# Patient Record
Sex: FEMALE | Race: BLACK OR AFRICAN AMERICAN | NOT HISPANIC OR LATINO | Employment: OTHER | URBAN - METROPOLITAN AREA
[De-identification: names, ages, dates, MRNs, and addresses within clinical notes are randomized per-mention and may not be internally consistent; named-entity substitution may affect disease eponyms.]

---

## 2018-06-21 ENCOUNTER — HISTORICAL (OUTPATIENT)
Dept: ADMINISTRATIVE | Facility: HOSPITAL | Age: 33
End: 2018-06-21

## 2018-06-21 LAB
ALT SERPL-CCNC: 34 UNIT/L (ref 12–78)
AST SERPL-CCNC: 24 UNIT/L (ref 15–37)
CREAT SERPL-MCNC: 0.74 MG/DL (ref 0.55–1.02)
CREAT UR-MCNC: 191 MG/DL
ERYTHROCYTE [DISTWIDTH] IN BLOOD BY AUTOMATED COUNT: 13.3 % (ref 11.5–17)
GLUCOSE 1H P 100 G GLC PO SERPL-MCNC: 74 MG/DL (ref 100–180)
HCT VFR BLD AUTO: 35 % (ref 37–47)
HGB BLD-MCNC: 11.1 GM/DL (ref 12–16)
LDH SERPL-CCNC: 230 UNIT/L (ref 84–246)
MCH RBC QN AUTO: 29.5 PG (ref 27–31)
MCHC RBC AUTO-ENTMCNC: 31.7 GM/DL (ref 33–36)
MCV RBC AUTO: 93.1 FL (ref 80–94)
PLATELET # BLD AUTO: 297 X10(3)/MCL (ref 130–400)
PMV BLD AUTO: 10.6 FL (ref 9.4–12.4)
PROT 24H UR-MCNC: 45.9 MG/24HR (ref 0–165)
RBC # BLD AUTO: 3.76 X10(6)/MCL (ref 4.2–5.4)
URATE SERPL-MCNC: 2.8 MG/DL (ref 2.6–7.2)
WBC # SPEC AUTO: 12.5 X10(3)/MCL (ref 4.5–11.5)

## 2018-09-13 ENCOUNTER — HISTORICAL (OUTPATIENT)
Dept: ADMINISTRATIVE | Facility: HOSPITAL | Age: 33
End: 2018-09-13

## 2018-09-13 LAB — GLUCOSE 1H P 100 G GLC PO SERPL-MCNC: 134 MG/DL (ref 100–180)

## 2018-09-28 ENCOUNTER — HISTORICAL (OUTPATIENT)
Dept: ADMINISTRATIVE | Facility: HOSPITAL | Age: 33
End: 2018-09-28

## 2018-09-28 LAB — GLUCOSE BS SERPL-MCNC: 84 MG/DL (ref 70–115)

## 2018-10-31 ENCOUNTER — HISTORICAL (OUTPATIENT)
Dept: ADMINISTRATIVE | Facility: HOSPITAL | Age: 33
End: 2018-10-31

## 2018-10-31 LAB
GLUCOSE 1H P 100 G GLC PO SERPL-MCNC: 132 MG/DL (ref 100–180)
GLUCOSE 2H P 100 G GLC PO SERPL-MCNC: 94 MG/DL (ref 70–140)
GLUCOSE 3H P 100 G GLC PO SERPL-MCNC: 68 MG/DL (ref 70–115)
GLUCOSE BS SERPL-MCNC: 104 MG/DL (ref 70–115)

## 2022-07-23 ENCOUNTER — HOSPITAL ENCOUNTER (EMERGENCY)
Facility: HOSPITAL | Age: 37
Discharge: HOME | End: 2022-07-23
Attending: STUDENT IN AN ORGANIZED HEALTH CARE EDUCATION/TRAINING PROGRAM
Payer: MEDICAID

## 2022-07-23 VITALS
HEIGHT: 67 IN | OXYGEN SATURATION: 100 % | BODY MASS INDEX: 35.31 KG/M2 | TEMPERATURE: 98 F | HEART RATE: 105 BPM | DIASTOLIC BLOOD PRESSURE: 80 MMHG | RESPIRATION RATE: 20 BRPM | WEIGHT: 225 LBS | SYSTOLIC BLOOD PRESSURE: 119 MMHG

## 2022-07-23 DIAGNOSIS — S61.412A LACERATION OF LEFT HAND WITHOUT FOREIGN BODY, INITIAL ENCOUNTER: Primary | ICD-10-CM

## 2022-07-23 PROCEDURE — 99284 EMERGENCY DEPT VISIT MOD MDM: CPT | Mod: 25

## 2022-07-23 PROCEDURE — 12001 RPR S/N/AX/GEN/TRNK 2.5CM/<: CPT | Mod: LT

## 2022-07-23 RX ORDER — IBUPROFEN 800 MG/1
800 TABLET ORAL EVERY 6 HOURS PRN
Qty: 20 TABLET | Refills: 0 | Status: SHIPPED | OUTPATIENT
Start: 2022-07-23 | End: 2022-07-28

## 2022-07-23 RX ORDER — SULFAMETHOXAZOLE AND TRIMETHOPRIM 800; 160 MG/1; MG/1
1 TABLET ORAL 2 TIMES DAILY
Qty: 10 TABLET | Refills: 0 | Status: SHIPPED | OUTPATIENT
Start: 2022-07-23 | End: 2022-07-28

## 2022-07-24 NOTE — ED PROVIDER NOTES
"Encounter Date: 7/23/2022       History     Chief Complaint   Patient presents with    Hand Injury     Complaint of cutting left hand with "craft hot knife".  No active bleeding.      36 y.o. female presents to the ED with laceration to left hand onset PTA. States she was engraving a vase when the sautering knife cut her hand. Denies any other injuries. Tetanus UTD. Bleeding controlled    The history is provided by the patient. No  was used.   Hand Injury   The incident occurred just prior to arrival. The incident occurred at home. The injury mechanism was an incision. The pain is present in the left hand. The quality of the pain is described as throbbing. The pain has been constant since the incident. Pertinent negatives include no fever. She reports no foreign bodies present. The symptoms are aggravated by movement.     Review of patient's allergies indicates:   Allergen Reactions    Promethazine Hives    Hydromorphone Anxiety     No past medical history on file.  No past surgical history on file.  No family history on file.     Review of Systems   Constitutional: Negative for chills and fever.   Eyes: Negative for visual disturbance.   Respiratory: Negative for cough and shortness of breath.    Cardiovascular: Negative for chest pain.   Gastrointestinal: Negative for abdominal pain, nausea and vomiting.   Genitourinary: Negative for dysuria.   Musculoskeletal: Negative for arthralgias.   Skin: Positive for wound. Negative for color change and rash.   Neurological: Negative for dizziness and headaches.   Psychiatric/Behavioral: Negative for behavioral problems.   All other systems reviewed and are negative.      Physical Exam     Initial Vitals [07/23/22 2006]   BP Pulse Resp Temp SpO2   119/80 105 20 98.2 °F (36.8 °C) 100 %      MAP       --         Physical Exam    Nursing note and vitals reviewed.  Constitutional: She appears well-developed and well-nourished.   HENT:   Head: " Normocephalic and atraumatic.   Eyes: EOM are normal. Pupils are equal, round, and reactive to light.   Neck: Neck supple.   Cardiovascular: Normal rate, regular rhythm and normal heart sounds.   Pulmonary/Chest: Breath sounds normal.   Abdominal: Abdomen is soft. Bowel sounds are normal.   Musculoskeletal:         General: Normal range of motion.      Cervical back: Neck supple.     Neurological: She is alert and oriented to person, place, and time. She has normal strength. GCS score is 15. GCS eye subscore is 4. GCS verbal subscore is 5. GCS motor subscore is 6.   Skin: Skin is warm and dry.        Psychiatric: She has a normal mood and affect.         ED Course   Lac Repair    Date/Time: 7/23/2022 9:02 PM  Performed by: Seb Tamayo PA-C  Authorized by: Agustin Cotton MD     Consent:     Consent obtained:  Verbal    Consent given by:  Patient    Risks, benefits, and alternatives were discussed: yes      Risks discussed:  Infection and pain  Universal protocol:     Patient identity confirmed:  Verbally with patient  Anesthesia:     Anesthesia method:  Local infiltration    Local anesthetic:  Lidocaine 2% w/o epi  Laceration details:     Location:  Hand    Hand location:  L hand, dorsum    Length (cm):  1  Exploration:     Hemostasis achieved with:  Direct pressure    Contaminated: no    Treatment:     Area cleansed with:  Povidone-iodine    Amount of cleaning:  Standard    Irrigation solution:  Sterile saline    Debridement:  None  Skin repair:     Repair method:  Sutures    Suture size:  5-0    Suture material:  Prolene    Suture technique:  Simple interrupted    Number of sutures:  3  Approximation:     Approximation:  Close  Repair type:     Repair type:  Simple  Post-procedure details:     Dressing:  Non-adherent dressing    Procedure completion:  Tolerated well, no immediate complications      Labs Reviewed - No data to display       Imaging Results    None          Medications - No data to  display  Medical Decision Making:   Differential Diagnosis:   Laceration, wound, contusion                       Clinical Impression:   Final diagnoses:  [S68.412C] Laceration of left hand without foreign body, initial encounter (Primary)          ED Disposition Condition    Discharge Stable        ED Prescriptions     Medication Sig Dispense Start Date End Date Auth. Provider    sulfamethoxazole-trimethoprim 800-160mg (BACTRIM DS) 800-160 mg Tab Take 1 tablet by mouth 2 (two) times daily. for 5 days 10 tablet 7/23/2022 7/28/2022 Seb Tamayo PA-C    ibuprofen (ADVIL,MOTRIN) 800 MG tablet Take 1 tablet (800 mg total) by mouth every 6 (six) hours as needed for Pain. 20 tablet 7/23/2022 7/28/2022 Seb Tamayo PA-C        Follow-up Information     Follow up With Specialties Details Why Contact Info    Primary care provider  In 2 days As needed, For suture removal in 7-10 days     [facility] - Emergency Dept Emergency Medicine In 1 week If symptoms worsen, For suture removal in 7-10 days [address] [facility] [id number] [phone]           Seb Tamayo PA-C  07/23/22 0424

## 2022-11-22 ENCOUNTER — HOSPITAL ENCOUNTER (EMERGENCY)
Facility: HOSPITAL | Age: 37
Discharge: HOME | End: 2022-11-22
Attending: STUDENT IN AN ORGANIZED HEALTH CARE EDUCATION/TRAINING PROGRAM
Payer: MEDICAID

## 2022-11-22 VITALS
DIASTOLIC BLOOD PRESSURE: 74 MMHG | RESPIRATION RATE: 16 BRPM | TEMPERATURE: 98 F | BODY MASS INDEX: 33.75 KG/M2 | SYSTOLIC BLOOD PRESSURE: 135 MMHG | HEART RATE: 102 BPM | WEIGHT: 215 LBS | OXYGEN SATURATION: 100 %

## 2022-11-22 DIAGNOSIS — M54.2 NECK PAIN: Primary | ICD-10-CM

## 2022-11-22 DIAGNOSIS — R51.9 NONINTRACTABLE HEADACHE, UNSPECIFIED CHRONICITY PATTERN, UNSPECIFIED HEADACHE TYPE: ICD-10-CM

## 2022-11-22 DIAGNOSIS — J11.1 FLU: ICD-10-CM

## 2022-11-22 LAB
ABS NEUT (OLG): 4.54 X10(3)/MCL (ref 2.1–9.2)
ALBUMIN SERPL-MCNC: 3.2 GM/DL (ref 3.5–5)
ALBUMIN/GLOB SERPL: 0.9 RATIO (ref 1.1–2)
ALP SERPL-CCNC: 86 UNIT/L (ref 40–150)
ALT SERPL-CCNC: 11 UNIT/L (ref 0–55)
APPEARANCE UR: ABNORMAL
AST SERPL-CCNC: 15 UNIT/L (ref 5–34)
B-HCG SERPL QL: NEGATIVE
BACTERIA #/AREA URNS AUTO: ABNORMAL /HPF
BILIRUB UR QL STRIP.AUTO: NEGATIVE MG/DL
BILIRUBIN DIRECT+TOT PNL SERPL-MCNC: 0.1 MG/DL
BUN SERPL-MCNC: 10.6 MG/DL (ref 7–18.7)
CALCIUM SERPL-MCNC: 8.9 MG/DL (ref 8.4–10.2)
CHLORIDE SERPL-SCNC: 107 MMOL/L (ref 98–107)
CO2 SERPL-SCNC: 27 MMOL/L (ref 22–29)
COLOR UR AUTO: YELLOW
CREAT SERPL-MCNC: 0.8 MG/DL (ref 0.55–1.02)
EOSINOPHIL NFR BLD MANUAL: 0.15 X10(3)/MCL (ref 0–0.9)
EOSINOPHIL NFR BLD MANUAL: 2 %
ERYTHROCYTE [DISTWIDTH] IN BLOOD BY AUTOMATED COUNT: 13.2 % (ref 11.5–17)
FLUAV AG UPPER RESP QL IA.RAPID: DETECTED
FLUBV AG UPPER RESP QL IA.RAPID: NOT DETECTED
GFR SERPLBLD CREATININE-BSD FMLA CKD-EPI: >60 MLS/MIN/1.73/M2
GIANT PLATELETS: NORMAL
GLOBULIN SER-MCNC: 3.6 GM/DL (ref 2.4–3.5)
GLUCOSE SERPL-MCNC: 114 MG/DL (ref 74–100)
GLUCOSE UR QL STRIP.AUTO: NEGATIVE MG/DL
HCT VFR BLD AUTO: 42.5 % (ref 37–47)
HGB BLD-MCNC: 13.1 GM/DL (ref 12–16)
IMM GRANULOCYTES # BLD AUTO: 0.02 X10(3)/MCL (ref 0–0.04)
IMM GRANULOCYTES NFR BLD AUTO: 0.3 %
INSTRUMENT WBC (OLG): 7.7 X10(3)/MCL
KETONES UR QL STRIP.AUTO: NEGATIVE MG/DL
LEUKOCYTE ESTERASE UR QL STRIP.AUTO: NEGATIVE UNIT/L
LYMPHOCYTES NFR BLD MANUAL: 2.46 X10(3)/MCL
LYMPHOCYTES NFR BLD MANUAL: 32 %
MCH RBC QN AUTO: 28.2 PG (ref 27–31)
MCHC RBC AUTO-ENTMCNC: 30.8 MG/DL (ref 33–36)
MCV RBC AUTO: 91.4 FL (ref 80–94)
MONOCYTES NFR BLD MANUAL: 0.62 X10(3)/MCL (ref 0.1–1.3)
MONOCYTES NFR BLD MANUAL: 8 %
NEUTROPHILS NFR BLD MANUAL: 59 %
NITRITE UR QL STRIP.AUTO: NEGATIVE
NRBC BLD AUTO-RTO: 0 %
PH UR STRIP.AUTO: 7 [PH]
PLATELET # BLD AUTO: 339 X10(3)/MCL (ref 130–400)
PLATELET # BLD EST: NORMAL 10*3/UL
PMV BLD AUTO: 10.2 FL (ref 7.4–10.4)
POTASSIUM SERPL-SCNC: 4.4 MMOL/L (ref 3.5–5.1)
PROT SERPL-MCNC: 6.8 GM/DL (ref 6.4–8.3)
PROT UR QL STRIP.AUTO: NEGATIVE MG/DL
RBC # BLD AUTO: 4.65 X10(6)/MCL (ref 4.2–5.4)
RBC #/AREA URNS AUTO: <5 /HPF
RBC MORPH BLD: NORMAL
RBC UR QL AUTO: NEGATIVE UNIT/L
RET# (OHS): 0.04 (ref 0.02–0.08)
RETICULOCYTE COUNT AUTOMATED (OLG): 0.87 % (ref 1.1–2.1)
SARS-COV-2 RNA RESP QL NAA+PROBE: NOT DETECTED
SODIUM SERPL-SCNC: 141 MMOL/L (ref 136–145)
SP GR UR STRIP.AUTO: 1.02 (ref 1–1.03)
SQUAMOUS #/AREA URNS AUTO: 14 /HPF
UROBILINOGEN UR STRIP-ACNC: 0.2 MG/DL
WBC # SPEC AUTO: 7.7 X10(3)/MCL (ref 4.5–11.5)
WBC #/AREA URNS AUTO: 6 /HPF

## 2022-11-22 PROCEDURE — 81025 URINE PREGNANCY TEST: CPT | Performed by: PHYSICIAN ASSISTANT

## 2022-11-22 PROCEDURE — 81001 URINALYSIS AUTO W/SCOPE: CPT | Performed by: PHYSICIAN ASSISTANT

## 2022-11-22 PROCEDURE — 85045 AUTOMATED RETICULOCYTE COUNT: CPT | Performed by: PHYSICIAN ASSISTANT

## 2022-11-22 PROCEDURE — 85027 COMPLETE CBC AUTOMATED: CPT | Performed by: PHYSICIAN ASSISTANT

## 2022-11-22 PROCEDURE — 99283 EMERGENCY DEPT VISIT LOW MDM: CPT | Mod: 25

## 2022-11-22 PROCEDURE — 80053 COMPREHEN METABOLIC PANEL: CPT | Performed by: PHYSICIAN ASSISTANT

## 2022-11-22 PROCEDURE — 0240U COVID/FLU A&B PCR: CPT | Performed by: PHYSICIAN ASSISTANT

## 2022-11-22 PROCEDURE — 85025 COMPLETE CBC W/AUTO DIFF WBC: CPT | Performed by: PHYSICIAN ASSISTANT

## 2022-11-22 RX ORDER — OSELTAMIVIR PHOSPHATE 75 MG/1
75 CAPSULE ORAL 2 TIMES DAILY
Qty: 10 CAPSULE | Refills: 0 | Status: SHIPPED | OUTPATIENT
Start: 2022-11-22 | End: 2022-11-27

## 2022-11-22 RX ORDER — KETOROLAC TROMETHAMINE 30 MG/ML
60 INJECTION, SOLUTION INTRAMUSCULAR; INTRAVENOUS
Status: DISCONTINUED | OUTPATIENT
Start: 2022-11-22 | End: 2022-11-22 | Stop reason: HOSPADM

## 2022-11-22 NOTE — FIRST PROVIDER EVALUATION
Medical screening examination initiated.  I have conducted a focused provider triage encounter, findings are as follows:    Brief history of present illness:  38 yo female with hx of sickle cell for evaluation of neck pain radiating to her head. States subjective fever at home.     Vitals:    11/22/22 1638   BP: 110/76   BP Location: Left arm   Patient Position: Sitting   Pulse: 105   Resp: 16   Temp: 98.4 °F (36.9 °C)   TempSrc: Oral   SpO2: 98%   Weight: 97.5 kg (215 lb)       Pertinent physical exam:  Patient is awake and alert and oriented.  Ambulatory to triage.  In no acute distress.      Brief workup plan:  Labs, COVID/flu, UA, UPT, toradol IM    Preliminary workup initiated; this workup will be continued and followed by the physician or advanced practice provider that is assigned to the patient when roomed.

## 2022-11-23 NOTE — ED PROVIDER NOTES
Encounter Date: 11/22/2022       History     Chief Complaint   Patient presents with    Headache    Neck Pain     Pt presents c/o subjective fever, headache and neck pain on right side. Onset x 3 days.  Denies n/v.  PMH sickle cell.      37 y.o.  female with a history of sickle cell anemia presents to Emergency Department with a chief complaint of headache. Symptoms began 3 days ago and have been constant since onset. Her head pain is currently rated as a 4/10 in severity with no radiation. Associated symptoms include atraumatic neck pain and subjective fever. Symptoms are aggravated with movement and there are no alleviating factors. The patient denies CP, SOB, chills, weakness, abdominal pain, or dizziness. She reports taking Theraflu and NyQuil prior to arrival with mild relief of symptoms. No other reported symptoms at this time.       The history is provided by the patient. No  was used.   Headache   This is a new problem. The current episode started in the past 7 days. The problem occurs daily. The problem has been unchanged. The pain does not radiate. The pain is at a severity of 4/10. Associated symptoms include a fever and neck pain. Pertinent negatives include no abdominal pain, back pain, dizziness, nausea, numbness, photophobia, rhinorrhea, scalp tenderness, sore throat, tingling, vomiting or weakness.   Neck Pain   This is a new problem. The current episode started several days ago. The problem occurs throughout the day. The problem has been unchanged. The pain is associated with nothing. The pain is present in the generalized neck. The quality of the pain is described as stabbing. The pain is at a severity of 4/10. Associated symptoms include headaches. Pertinent negatives include no photophobia, no chest pain, no numbness, no bowel incontinence, no bladder incontinence, no leg pain, no paresis, no tingling and no weakness.   Review of patient's allergies indicates:    Allergen Reactions    Promethazine Hives    Hydromorphone Anxiety     Past Medical History:   Diagnosis Date    Sickle-cell disease without crisis      History reviewed. No pertinent surgical history.  No family history on file.  Social History     Tobacco Use    Smoking status: Never    Smokeless tobacco: Never   Substance Use Topics    Alcohol use: Never     Review of Systems   Constitutional:  Positive for fever. Negative for chills and fatigue.   HENT:  Negative for rhinorrhea and sore throat.    Eyes:  Negative for photophobia and visual disturbance.   Respiratory:  Negative for chest tightness, shortness of breath and stridor.    Cardiovascular:  Negative for chest pain and leg swelling.   Gastrointestinal:  Negative for abdominal pain, bowel incontinence, nausea and vomiting.   Genitourinary:  Negative for bladder incontinence.   Musculoskeletal:  Positive for neck pain. Negative for back pain.   Skin:  Negative for color change and wound.   Neurological:  Positive for headaches. Negative for dizziness, tingling, weakness and numbness.   All other systems reviewed and are negative.    Physical Exam     Initial Vitals [11/22/22 1638]   BP Pulse Resp Temp SpO2   110/76 105 16 98.4 °F (36.9 °C) 98 %      MAP       --         Physical Exam    Nursing note and vitals reviewed.  Constitutional: She appears well-developed and well-nourished. She is not diaphoretic. No distress.   HENT:   Head: Normocephalic and atraumatic.   Right Ear: External ear normal.   Left Ear: External ear normal.   Mouth/Throat: Oropharynx is clear and moist. No oropharyngeal exudate.   Eyes: Conjunctivae and EOM are normal. Pupils are equal, round, and reactive to light. Right eye exhibits no discharge. Left eye exhibits no discharge.   Neck: Neck supple. No JVD present.   Normal range of motion.  Cardiovascular:  Normal rate, regular rhythm, normal heart sounds and intact distal pulses.           Pulmonary/Chest: Breath sounds normal.  No stridor. No respiratory distress. She has no wheezes. She exhibits no tenderness.   Abdominal: Abdomen is soft. Bowel sounds are normal. She exhibits no distension. There is no abdominal tenderness. There is no guarding.   Musculoskeletal:         General: Tenderness (to neck) present. No edema. Normal range of motion.      Cervical back: Normal range of motion and neck supple. No edema or erythema. Muscular tenderness present.     Neurological: She is alert and oriented to person, place, and time. She has normal strength. No sensory deficit. GCS score is 15. GCS eye subscore is 4. GCS verbal subscore is 5. GCS motor subscore is 6.   Skin: Skin is warm and dry. Capillary refill takes less than 2 seconds. No rash noted. No erythema.   Psychiatric: She has a normal mood and affect. Thought content normal.       ED Course   Procedures  Labs Reviewed   COVID/FLU A&B PCR - Abnormal; Notable for the following components:       Result Value    Influenza A PCR Detected (*)     All other components within normal limits    Narrative:     The Xpert Xpress SARS-CoV-2/FLU/RSV plus is a rapid, multiplexed real-time PCR test intended for the simultaneous qualitative detection and differentiation of SARS-CoV-2, Influenza A, Influenza B, and respiratory syncytial virus (RSV) viral RNA in either nasopharyngeal swab or nasal swab specimens.         URINALYSIS, REFLEX TO URINE CULTURE - Abnormal; Notable for the following components:    Appearance, UA Cloudy (*)     All other components within normal limits   COMPREHENSIVE METABOLIC PANEL - Abnormal; Notable for the following components:    Glucose Level 114 (*)     Albumin Level 3.2 (*)     Globulin 3.6 (*)     Albumin/Globulin Ratio 0.9 (*)     All other components within normal limits   RETICULOCYTES - Abnormal; Notable for the following components:    Retic Cnt Auto 0.87 (*)     All other components within normal limits   CBC WITH DIFFERENTIAL - Abnormal; Notable for the  following components:    MCHC 30.8 (*)     All other components within normal limits   URINALYSIS, MICROSCOPIC - Abnormal; Notable for the following components:    WBC, UA 6 (*)     Squamous Epithelial Cells, UA 14 (*)     Bacteria, UA 1+ (*)     All other components within normal limits   PREGNANCY TEST, URINE RAPID - Normal   CBC W/ AUTO DIFFERENTIAL    Narrative:     The following orders were created for panel order CBC auto differential.  Procedure                               Abnormality         Status                     ---------                               -----------         ------                     CBC with Differential[[id number]]        Abnormal            Final result               Manual Differential[[id number]]                              Final result                 Please view results for these tests on the individual orders.   MANUAL DIFFERENTIAL          Imaging Results    None          Medications   ketorolac injection 60 mg (has no administration in time range)     Medical Decision Making:   Differential Diagnosis:   Flu, COVID, Sickle Cell Crisis, URI   Clinical Tests:   Lab Tests: Ordered and Reviewed  The following lab test(s) were unremarkable: CBC, CMP and UPT       <> Summary of Lab: Flu A- positive   ED Management:  Due to patient's complaints and medical history, labs and swabs ordered. Patient positive for Flu A. Small amount of bacteria noted in UA with squamous cells, since patient denies urinary symptoms, will await culture for further care. Ordered Toradol in ER to treat symptoms. Educated patient on findings. Patient denies new or additional complaints; no further tests indicated at this time. Verbalized understanding of instructions. No emergent or apparent distress noted prior to discharge. To follow up with PCP in 1 week as needed. Prescribed Tamiflu for symptoms. Strict ER return precautions given.                           Clinical Impression:   Final diagnoses:  [M54.2]  Neck pain (Primary)  [R51.9] Nonintractable headache, unspecified chronicity pattern, unspecified headache type  [J11.1] Flu      ED Disposition Condition    Discharge Stable          ED Prescriptions       Medication Sig Dispense Start Date End Date Auth. Provider    oseltamivir (TAMIFLU) 75 MG capsule Take 1 capsule (75 mg total) by mouth 2 (two) times daily. for 5 days 10 capsule 11/22/2022 11/27/2022 Gail Lopes NP          Follow-up Information       Follow up With Specialties Details Why Contact Info    PCP  Call in 1 week As needed, If symptoms worsen     [facility] - Emergency Dept Emergency Medicine Go to  If symptoms worsen, As needed [address] [facility] [address]  [phone]             Gail Lopes NP  11/22/22 2013

## 2022-11-23 NOTE — DISCHARGE INSTRUCTIONS
Thanks for letting us take care of you today!  It is our goal to give you courteous care and to keep you comfortable and informed, if you have any questions before you leave I will be happy to try and answer them.    Here is some advice after your visit:      Your visit in the emergency department is NOT definitive care - please follow-up with your primary care doctor and/or specialist within 1 week.  Please return if you have any worsening in your condition or if you have any other concerns.    Please review any LAB WORK from your visit today with your primary care physician.    Hydrate with plenty of water. Tylenol and ibuprofen in rotation every 4-6 hours for fever/aches. Over the counter medicine for symptom relief. Stay home as you are contagious. If symptoms worsen, return to ER.